# Patient Record
Sex: FEMALE | Race: BLACK OR AFRICAN AMERICAN | Employment: PART TIME | URBAN - METROPOLITAN AREA
[De-identification: names, ages, dates, MRNs, and addresses within clinical notes are randomized per-mention and may not be internally consistent; named-entity substitution may affect disease eponyms.]

---

## 2017-12-03 ENCOUNTER — HOSPITAL ENCOUNTER (EMERGENCY)
Age: 62
Discharge: LEFT BEFORE DISCHARGE | End: 2017-12-03
Attending: EMERGENCY MEDICINE
Payer: SELF-PAY

## 2017-12-03 VITALS
WEIGHT: 270 LBS | HEART RATE: 84 BPM | OXYGEN SATURATION: 100 % | HEIGHT: 66 IN | BODY MASS INDEX: 43.39 KG/M2 | SYSTOLIC BLOOD PRESSURE: 162 MMHG | DIASTOLIC BLOOD PRESSURE: 100 MMHG | TEMPERATURE: 97.7 F | RESPIRATION RATE: 12 BRPM

## 2017-12-03 PROCEDURE — 75810000275 HC EMERGENCY DEPT VISIT NO LEVEL OF CARE

## 2019-11-07 ENCOUNTER — HOSPITAL ENCOUNTER (OUTPATIENT)
Dept: MAMMOGRAPHY | Age: 64
Discharge: HOME | End: 2019-11-07
Attending: INTERNAL MEDICINE
Payer: COMMERCIAL

## 2019-11-07 DIAGNOSIS — Z12.31 VISIT FOR SCREENING MAMMOGRAM: ICD-10-CM

## 2019-11-07 PROCEDURE — 77063 BREAST TOMOSYNTHESIS BI: CPT

## 2020-11-10 ENCOUNTER — HOSPITAL ENCOUNTER (OUTPATIENT)
Dept: MAMMOGRAPHY | Age: 65
Discharge: HOME | End: 2020-11-10
Payer: MEDICAID

## 2020-11-10 DIAGNOSIS — Z12.31 VISIT FOR SCREENING MAMMOGRAM: ICD-10-CM

## 2020-11-10 PROCEDURE — 77063 BREAST TOMOSYNTHESIS BI: CPT

## 2022-03-15 ENCOUNTER — HOSPITAL ENCOUNTER (OUTPATIENT)
Dept: PHYSICAL THERAPY | Age: 67
Discharge: HOME | End: 2022-03-15
Payer: MEDICARE

## 2022-03-15 PROCEDURE — 97161 PT EVAL LOW COMPLEX 20 MIN: CPT

## 2022-03-15 NOTE — PROGRESS NOTES
[facility] [facility] PHYSICAL THERAPY  [address] [place] [address], [address] - Phone: [phone]  Fax: [phone] / [facility]  Patient Name: Kathy Salgado : 1955   Medical   Diagnosis: Right foot pain [M79.671] Treatment Diagnosis: Right Achilles Tendinosis, Pamela's Deformity, Plantar Fasciitis   Onset Date: 2021     Referral Source: David Dunne DPM Start of Care [facility]): 3/15/2022   Prior Hospitalization: See medical history Provider #: [id number]   Prior Level of Function: Recreational walking > 1 mile   Comorbidities: Overweight, HTN,   Medications: Verified on Patient Summary List   The Plan of Care and following information is based on the information from the initial evaluation.   ===========================================================================================  Assessment / key information: Patient is a 73 y.o. female presenting with right foot/ankle pain x 4 months, insidious onset in nature. Patient reports pain has progressed and made her halt her recreational walking, now unable to stand > 10' without significant pain. She also reports disturbed sleep and pain with driving. Objective measures are as follows:                                    AROM                   PROM                Strength (1-5)    Left Right Left Right Left  Right   Dorsiflexsion 5 0 8 7P! 4 4   Plantarflexsion 40 38 49 41 4 4   Inversion 15 15 40 37 4 4   Eversion 15 8 22 20 5 5      Pt  will benefit from physical therapist management to address her impairments (listed below),  educate her, and improve her level of function.  Thanks for your referral.   ===========================================================================================  Eval Complexity: History: MEDIUM  Complexity : 1-2 comorbidities / personal factors will impact the outcome/ POC Exam:MEDIUM Complexity : 3 Standardized tests and measures addressing body structure, function, activity limitation and / or participation in recreation  Presentation: LOW Complexity : Stable, uncomplicated  Clinical Decision Making:MEDIUM Complexity : FOTO score of 26-74Overall Complexity:LOW   Problem List: pain affecting function, decrease ROM, decrease strength, edema affecting function, impaired gait/ balance, decrease ADL/ functional abilitiies, decrease activity tolerance, decrease flexibility/ joint mobility and decrease transfer abilities  FOTO score: 43 indicating 57% functional disability  Treatment Plan may include any combination of the following: Therapeutic exercise, Therapeutic activities, Neuromuscular re-education, Physical agent/modality, Gait/balance training, Patient education, Self Care training, Functional mobility training, Home safety training and Stair training  Patient / Family readiness to learn indicated by: asking questions, trying to perform skills and interest  Persons(s) to be included in education: patient (P)  Barriers to Learning/Limitations: None  Measures taken: n/a   Patient Goal (s): \"walk better and try to avoid getting cut\"   Patient self reported health status: good  Rehabilitation Potential: good   Short Term Goals: To be accomplished in  3  weeks:  1. Patient to be adherent to HEP to facilitate pain control with ADL's.  2. Patient to report > 50% improvement in sleep interrupted by ankle/foot pain. 3. Patient to increase right ankle DF to > 10 degrees to facilitate a more normalized gait.  Long Term Goals: To be accomplished in  6  weeks:  1. Patient to be Safe and Independent with HEP to self-manage/prevent symptoms after DC. 2. Patient to increase FS FOTO score to > 63 to indicate increased functional independence. 3. Patient to demonstrate no difficulty negotiating stairs in reciprocal pattern to facilitate safety in community mobility.    4. Patient to resume her recreational walking program with > 75% improvement. 5. Patient to report pain free driving. Frequency / Duration:   Patient to be seen  2  times per week for 6  weeks:  Patient / Caregiver education and instruction: activity modification and exercises. We reviewed our facility's Patient Personal Responsibilities (PPR) form, particularly in regards to compliance towards her appointment time, our attendance policy, and her home exercise program. Patient was informed of possible discharge for non-compliance to our attendance policy per PPR form. We also discussed her POC as deemed appropriate by the treating therapist and physician. Patient verbalized understanding that she must show objective and functional improvement in an appropriate time frame. Patient verbalized understanding that should progress or compliance be lacking, we will contact the referring physician for further consultation to address and attempt to establish alternate treatment strategies as necessary and/or possibly discharge. Therapist Signature: Leonard Carballo \"BJ\" Michael Werner DPT, Cert. MDT, Cert. DN, Cert. SMT, Dip. Osteopractic Date: 0/31/4169   Certification Period: 3/15/22-6/14/22 Time: 10:50 AM   ===========================================================================================  I certify that the above Physical Therapy Services are being furnished while the patient is under my care. I agree with the treatment plan and certify that this therapy is necessary. Physician Signature:        Date:       Time:                                         Mary Reyes DPM   Please sign and return to [employer] or you may fax the signed copy to [phone]. Thank you.

## 2022-03-15 NOTE — PROGRESS NOTES
PHYSICAL THERAPY - DAILY TREATMENT NOTE  Patient Name: Fredy Brown        Date: 3/15/2022  : 1955   [x]  Patient  Verified  Visit #:   1     Insurance: Payor: Bebeto Reyna / Plan: [email or web address] / Product Type: Managed Care Medicare /      In time:   9:55          Out time:   10:40 Total Treatment Time (min):   45   Medicare Time Tracking (below)   Total Timed Codes (min):  0 1:1 Treatment Time:  0     SUBJECTIVE    Pain Level (on 0 to 10 scale):  8  / 10   Medication Changes/New allergies or changes in medical history, any new surgeries or procedures? []  No    []  Yes   If yes, update Summary List:    Subjective Functional Status/Changes:  []  No changes reported     HISTORY    Present Symptoms:Right achilles, plantar pain. Present since: 4 months ago  [] Improving []  Unchanging []  Worsening          Commenced as a result of:   or [x]  No apparent reason   Had been on a walking program for about a year. Reports had experienced similar ~1.5 years ago but never this bad. Symptoms at onset: achilles pain    Constant symptoms: none    Intermittent symptoms: pain to the heel, calf, foot, Gt. What produces or worsens: standing >10'. Has been unable to walk as long. What stops or reduces: tylenol, rest    Continued use makes the pain:  [] Better [x]  Worse []  No effect     Disturbed night: [] No    [x] Yes    Pain at rest: [] No    [x] Yes       Treatments this episode: prednisone    Previous episodes: 1      Other questions: , mainly working from home    Paraesthesia: [] No    [] Yes     General Health:  [] Good []  Fair []  Poor     Imaging:   [] Yes []  No       Work:  Mechanical Stresses:sitting, sometimes drives    Leisure: Mechanical Stresses:walking    Functional disability from present episode:has had to stop recreational walking, stair intolerance (lives on 2nd floor), driving intolerance, limited foot ware.  AM pain    Summary:    [] Acute []  Sub-acute [x]  Chronic     [] Trauma/Surgery []  Insidious onset        OBJECTIVE    Physical Therapy Evaluation  - Foot and Ankle    Gait: [] Normal    [x] Abnormal    [x] Antalgic    [] NWB    Device:    ROM/Strength  [] Unable to assess at this time      AROM        PROM            Strength (1-5)   Left Right Left Right Left  Right   Dorsiflexsion 5 0 8 7P! 4 4   Plantarflexsion 40 38 49 41 4 4   Inversion 15 15 40 37 4 4   Eversion 15 8 22 20 5 5     Flexibility: [] Unable to assess at this time  Gastroc:    (L) Tightness [] WNL   [] Min   [x] Mod   [] Severe    (R) Tightness [] WNL   [] Min   [x] Mod   [] Severe  Soleus:    (L) Tightness [] WNL   [] Min   [x] Mod   [] Severe    (R) Tightness [] WNL   [] Min   [x] Mod   [] [id number]  Other:      (L) Tightness [] WNL   [] Min   [] Mod   [] Severe    (R) Tightness [] WNL   [] Min   [] Mod   [] Severe    Palpation:   Location:  Patient's Pain Response: [] medial gastroc head   Min   [] Mod   [] Severe  Location: plantar fascia  Patient's Pain Response: [] Min   [] Mod   [] Severe  Forefoot alignment:  [] Neutral     [] Varus            [] Valgus  Other tests/ comments:     Myotome Level Muscles Nerve Reflex Sensation Action   L1-L3 Iliopsoas T12/L1-3  Quadriceps L2-4  Adductors L2-L4 (Iliacus)- Femoral  Femoral  Obturator/Sciatic N/A  L3-4 = Patella L1- Inguinal Crease  L2- Anterior Thigh  L3- Anterior Thigh above knee Hip Flexion  Knee Extension   L4 Tibialis anterior L4&5   Deep Peroneal Patella Anterior Knee Suprapatellar Ankle Dorsiflexion   L5 Extensor Hallucis Longus  Extensor Digitorum Lungus  Gluteus Medius Deep Peroneal     Deep Peroneal    Superior Gluteal None Reliable Dorsum of Foot/Great Toe  Anterior Shank Extensor  to Great Toe        Hip Internal Rotation   S1 Peroneus Longus  Gastrocnemius & Soleus  Gluteus Bernard Superficial Peroneal  Tibial    Inferior Gluteal Achilles Tendon Lateral Shank around Lateral Malleolus  Lateral Aspect/Dorsum of GT   Plantar Flexion      Hip Extension   Notable findings from above: unremarkable      Post Treatment Pain Level (on 0 to 10) scale:   8  / 10     ASSESSMENT    Justification for Eval Code Complexity:  Patient History (low 0, mod 1-2, high 3-4): mod  Examination (low 1-2, mod 3+, high 4+): mod  Clinical Presentation (low stable or uncomplicated, mod evolving or changing, high unstable or unpredictable): low  Clinical Decision Making (low , mod 26-74, high 1-25): FOTO mod       []  See Progress Note/Recertification   Patient will continue to benefit from skilled therapy to address remaining functional deficits:  See PoC   Progress toward goals / Updated goals:    See PoC     PLAN    [x]  Upgrade activities as tolerated []  Continue plan of care   []  Discharge due to :    [x]  Other: Initiate POC     Therapist: Dieter Betts \"BJ\" Rachel Hernandes, DEANAT, Cert MDT, Cert. SMT, Dip.  Osteopractic Date: 3/15/2022     Time: 10:10 AM

## 2022-03-22 ENCOUNTER — HOSPITAL ENCOUNTER (OUTPATIENT)
Dept: PHYSICAL THERAPY | Age: 67
Discharge: HOME | End: 2022-03-22
Payer: MEDICARE

## 2022-03-22 PROCEDURE — 97110 THERAPEUTIC EXERCISES: CPT

## 2022-03-22 PROCEDURE — 97014 ELECTRIC STIMULATION THERAPY: CPT

## 2022-03-22 PROCEDURE — 97140 MANUAL THERAPY 1/> REGIONS: CPT

## 2022-03-23 ENCOUNTER — HOSPITAL ENCOUNTER (OUTPATIENT)
Dept: PHYSICAL THERAPY | Age: 67
Discharge: HOME | End: 2022-03-23
Payer: MEDICARE

## 2022-03-23 PROCEDURE — 97140 MANUAL THERAPY 1/> REGIONS: CPT

## 2022-03-23 PROCEDURE — 97112 NEUROMUSCULAR REEDUCATION: CPT

## 2022-03-23 PROCEDURE — 97110 THERAPEUTIC EXERCISES: CPT

## 2022-03-23 NOTE — PROGRESS NOTES
PHYSICAL THERAPY - DAILY TREATMENT NOTE    Patient Name: Elida Banks        Date: 3/23/2022  : 1955   YES Patient  Verified  Visit #:   3   of     Insurance: Payor: Rebeca Paige / Plan: [employer] / [employer] Type: Managed Care Medicare /      In time: 8:52 Out time: 9:30   Total Treatment Time: 38     Medicare/BCBS Time Tracking (below)   Total Timed Codes (min):  38 1:1 Treatment Time:  38     TREATMENT AREA = Right foot pain [M79.671]    SUBJECTIVE    Pain Level (on 0 to 10 scale):  5  / 10   Medication Changes/New allergies or changes in medical history, any new surgeries or procedures? NO    If yes, update Summary List   Subjective Functional Status/Changes:  []  No changes reported     \"Sorry I'm late. 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 The pain isn't bad. It comes and goes. \"          OBJECTIVE     Therapeutic Procedures:  Min Procedure Specifics + Rationale   n/a [x]  Patient Education (performed throughout session) [x] Review HEP    [] Progressed/Changed HEP based on:   [] proper performance and advancement of Therex/TA   [] reduction in pain level    [] increased functional capacity       [] change in directional preference   23 [x] Therapeutic Exercise   [x]  See Flowsheet   Rationale: increase ROM and increase strength to improve the patients ability to participate in ADL's    15 [x]  Manual Therapy    5 [] IASTM - FRAM, dynamic cupping  [x] repeated DF mobilization  Rationale: decrease pain, increase ROM, increase tissue extensibility, decrease trigger points and increase postural awareness to attain functional use and participation with ADL's  [x] Skin assessment post-treatment:  [x]intact [x]redness- no adverse reaction   []redness  adverse  The manual therapy interventions were performed at a separate and distinct time from the therapeutic activities interventions.      8 [x] Neuromuscular Re-ed   [x]  See Flowsheet  Sima Perez Brandan's  Rationale: increase ROM, increase strength, improve coordination, improve balance and increase proprioception  to improve the patients ability to safely participate in ADL's           Other Objective/Functional Measures: Added and advanced therex per flow sheet. Educated patient re: dynamic cupping/FRAMS     Post Treatment Pain Level (on 0 to 10) scale:   0  / 10     ASSESSMENT    Assessment/Changes in Function:       DF responder          Patient will continue to benefit from skilled PT services to modify and progress therapeutic interventions, address functional mobility deficits, address ROM deficits, address strength deficits, analyze and address soft tissue restrictions, analyze and cue movement patterns, analyze and modify body mechanics/ergonomics, assess and modify postural abnormalities, address imbalance/dizziness and instruct in home and community integration  to attain remaining goals   Progress toward goals / Updated goals:    Able to alleviate and abolish with repeated movements. PLAN    [x]  Upgrade activities as tolerated  [x]  Update interventions per flow sheet YES Continue plan of care   []  Discharge due to :    []  Other:      Therapist: Ibrahima Valdivia \"BJ\" Kedar, SEAMUS, Cert. MDT, Cert. DN, Cert. SMT, Dip.  Osteopractic    Date: 3/23/2022 Time: 9:02 AM     Future Appointments   Date Time Provider Emery Altamirano   3/29/2022  8:45 AM Elba Pi, PT [facility]   3/30/2022  8:45 AM Elba Pi, PT [facility]   4/5/2022  8:45 AM Elba Pi, PT [facility]   4/7/2022  8:45 AM Elba Pi, PT [facility]   4/12/2022  8:45 AM Elba Pi, PT [facility]   4/14/2022  8:45 AM Elba Pi, PT [facility]   4/19/2022  8:45 AM Elba Pi, PT [facility]   4/21/2022  8:45 AM Elba Pi, PT [facility]   4/26/2022  8:45 AM Elba Mcdonnell, PT [facility]   4/27/2022  8:45 AM Elba Mcdonnell, PT [facility]

## 2022-03-29 ENCOUNTER — HOSPITAL ENCOUNTER (OUTPATIENT)
Dept: PHYSICAL THERAPY | Age: 67
Discharge: HOME | End: 2022-03-29
Payer: MEDICARE

## 2022-03-29 PROCEDURE — 97112 NEUROMUSCULAR REEDUCATION: CPT

## 2022-03-29 PROCEDURE — 97140 MANUAL THERAPY 1/> REGIONS: CPT

## 2022-03-29 PROCEDURE — 97014 ELECTRIC STIMULATION THERAPY: CPT

## 2022-03-29 PROCEDURE — 97110 THERAPEUTIC EXERCISES: CPT

## 2022-03-29 NOTE — PROGRESS NOTES
PHYSICAL THERAPY - DAILY TREATMENT NOTE    Patient Name: Lona Ovalles        Date: 3/29/2022  : 1955   YES Patient  Verified  Visit #:   4     Insurance: Payor: Satnam Keating / Plan: [employer] / Product Type: Managed Care Medicare /      In time: 8:30 Out time: 9:40   Total Treatment Time: 70     Medicare/BCBS Time Tracking (below)   Total Timed Codes (min):  70 1:1 Treatment Time:  55     TREATMENT AREA = Right foot pain [M79.671]    SUBJECTIVE    Pain Level (on 0 to 10 scale):  7  / 10   Medication Changes/New allergies or changes in medical history, any new surgeries or procedures? NO    If yes, update Summary List   Subjective Functional Status/Changes:  []  No changes reported     \"I'm walking a little better more and more. \"          OBJECTIVE     Therapeutic Procedures:  Min Procedure Specifics + Rationale   n/a [x]  Patient Education (performed throughout session) [x] Review HEP    [] Progressed/Changed HEP based on:   [] proper performance and advancement of Therex/TA   [] reduction in pain level    [] increased functional capacity       [] change in directional preference   25 [x] Therapeutic Exercise   [x]  See Flowsheet   Rationale: increase ROM and increase strength to improve the patients ability to participate in ADL's    15 [x]  Manual Therapy       [] IASTM - FRAM/Dynamic cupping to posterior leg  [x] G1-G4 talocrural distraction/posterior/anterior glides, G1-G3 calcaneal medial/lateral glides, G1-G4 tarsal mobs. Repeated DF mobilization  Rationale: decrease pain, increase ROM, increase tissue extensibility, decrease trigger points and increase postural awareness to attain functional use and participation with ADL's  [x] Skin assessment post-treatment:  [x]intact [x]redness- no adverse reaction   []redness  adverse  The manual therapy interventions were performed at a separate and distinct time from the therapeutic activities interventions.      15 [x] Neuromuscular Re-ed   [x]  See Flowsheet    Rationale: increase ROM, increase strength, improve coordination, improve balance and increase proprioception  to improve the patients ability to safely participate in ADL's       Modality rationale: decrease inflammation, decrease pain, increase tissue extensibility and increase muscle contraction/control to improve the patients ability to perform ADL's with greater ease     Min Type Additional Details   15 [x] E-Stim Type:Symmetrical Biphasic  Attended? NO Location: right ankle  [] supine              [x] prone  [x] legs elevated   [] legs flat  [] sitting   [] sidelying - [] left [] right  [] with heat  [] with ice  [] Vasopneumatic Device    [x] Skin assessment post-treatment:  [x]intact [x]redness- no adverse reaction       []redness  adverse reaction:       Other Objective/Functional Measures: Added and advanced therex per flow sheet. Increased reps/sets/resistance per flow sheet. Post Treatment Pain Level (on 0 to 10) scale:   6  / 10     ASSESSMENT    Assessment/Changes in Function:       Notable reduction in swelling of Pamela's deformity         Patient will continue to benefit from skilled PT services to modify and progress therapeutic interventions, address functional mobility deficits, address ROM deficits, address strength deficits, analyze and address soft tissue restrictions, analyze and cue movement patterns, analyze and modify body mechanics/ergonomics and instruct in home and community integration  to attain remaining goals   Progress toward goals / Updated goals:    Compliant to HEP, improved gait     PLAN    [x]  Upgrade activities as tolerated  [x]  Update interventions per flow sheet YES Continue plan of care   []  Discharge due to :    []  Other:      Therapist: Kwaku Meza \"BJ\" Kerry Estevez DPT, Cert. MDT, Cert. DN, Cert. SMT, Dip.  Osteopractic    Date: 3/29/2022 Time: 8:45 AM     Future Appointments   Date Time Provider Emery Altamirano 3/30/2022  8:45 AM Cristine Butler, PT [facility]   4/5/2022  8:45 AM Cristine Butler, PT [facility]   4/7/2022  8:45 AM Cristine Butler, PT [facility]   4/12/2022  8:45 AM Cristine Butler, PT [facility]   4/14/2022  8:45 AM Cristine Butler, PT [facility]   4/19/2022  8:45 AM Cristine Butler, PT [facility]   4/21/2022  8:45 AM Cristine Butler, PT [facility]   4/26/2022  8:45 AM Cristine Butler, PT [facility]   4/27/2022  8:45 AM [facility]

## 2022-03-30 ENCOUNTER — HOSPITAL ENCOUNTER (OUTPATIENT)
Dept: PHYSICAL THERAPY | Age: 67
Discharge: HOME | End: 2022-03-30
Payer: MEDICARE

## 2022-03-30 PROCEDURE — 97110 THERAPEUTIC EXERCISES: CPT

## 2022-03-30 PROCEDURE — 97112 NEUROMUSCULAR REEDUCATION: CPT

## 2022-03-30 PROCEDURE — 97140 MANUAL THERAPY 1/> REGIONS: CPT

## 2022-03-30 PROCEDURE — 97014 ELECTRIC STIMULATION THERAPY: CPT

## 2022-03-30 NOTE — PROGRESS NOTES
PHYSICAL THERAPY - DAILY TREATMENT NOTE    Patient Name: Becky Child        Date: 3/30/2022  : 1955   YES Patient  Verified  Visit #:   5   of     Insurance: Payor: [employer] / Plan: [employer] / Product Type: Managed Care Medicare /      In time: 8:38 Out time: 9:38   Total Treatment Time: 60     Medicare/BCBS Time Tracking (below)   Total Timed Codes (min):  60 1:1 Treatment Time:  45     TREATMENT AREA = Right foot pain [M79.671]    SUBJECTIVE    Pain Level (on 0 to 10 scale):  5  / 10   Medication Changes/New allergies or changes in medical history, any new surgeries or procedures? NO    If yes, update Summary List   Subjective Functional Status/Changes:  []  No changes reported     \"I was ok when I left. Sore this morning. \"          OBJECTIVE     Therapeutic Procedures:  Min Procedure Specifics + Rationale   n/a [x]  Patient Education (performed throughout session) [x] Review HEP    [] Progressed/Changed HEP based on:   [] proper performance and advancement of Therex/TA   [] reduction in pain level    [] increased functional capacity       [] change in directional preference   20 [x] Therapeutic Exercise   [x]  See Flowsheet   Rationale: increase ROM and increase strength to improve the patients ability to participate in ADL's    15 [x]  Manual Therapy         [x] G1-G4 talocrural distraction/posterior/anterior glides, G1-G3 calcaneal medial/lateral glides, G1-G4 tarsal mobs. Repeated DF mobilization    Rationale: decrease pain, increase ROM, increase tissue extensibility, decrease trigger points and increase postural awareness to attain functional use and participation with ADL's  [x] Skin assessment post-treatment:  [x]intact [x]redness- no adverse reaction   []redness  adverse  The manual therapy interventions were performed at a separate and distinct time from the therapeutic activities interventions.      10 [x] Neuromuscular Re-ed   [x]  See Flowsheet  Rationale: increase ROM, increase strength, improve coordination, improve balance and increase proprioception  to improve the patients ability to safely participate in ADL's       Modality rationale: decrease inflammation, decrease pain, increase tissue extensibility and increase muscle contraction/control to improve the patients ability to perform ADL's with greater ease     Min Type Additional Details   15 [x] E-Stim Type:Symmetrical Biphasic  Attended? NO Location: Ankle  [] supine              [] prone  [] legs elevated   [] legs flat  [] sitting   [] sidelying - [] left [] right  [] with heat  [] with ice  [] Vasopneumatic Device    [x] Skin assessment post-treatment:  [x]intact [x]redness- no adverse reaction       []redness  adverse reaction:       Other Objective/Functional Measures: Added and advanced therex per flow sheet. Post Treatment Pain Level (on 0 to 10) scale:   0 achilles, 0 plantar fascia, 7 nathaly  / 10     ASSESSMENT    Assessment/Changes in Function:       Increased standing activity tolerance         Patient will continue to benefit from skilled PT services to modify and progress therapeutic interventions, address functional mobility deficits, address ROM deficits, address strength deficits, analyze and address soft tissue restrictions, analyze and cue movement patterns, analyze and modify body mechanics/ergonomics and instruct in home and community integration  to attain remaining goals   Progress toward goals / Updated goals: Increased standing tolerance     PLAN    [x]  Upgrade activities as tolerated  [x]  Update interventions per flow sheet YES Continue plan of care   []  Discharge due to :    []  Other:      Therapist: Cruz Em \"BJ\" SEAMUS Thacker, Cert. MDT, Cert. DN, Cert. SMT, Dip.  Osteopractic    Date: 3/30/2022 Time: 9:05 AM     Future Appointments   Date Time Provider Emery Altamirano   4/5/2022  8:45 AM Oumar Anthony PT [facility]   4/7/2022  8:45 AM Kedar Jeremie Mortensen [facility]   4/12/2022  8:45 AM Pretty Pappas, PT [facility]   4/14/2022  8:45 AM Pretty Pappas, PT [facility]   4/19/2022  8:45 AM Pretty Pappas, PT [facility]   4/21/2022  8:45 AM Pretty Pappas, PT [facility]   4/26/2022  8:45 AM Pretty Pappas, PT [facility]   4/27/2022  8:45 AM [facility]

## 2022-04-05 ENCOUNTER — APPOINTMENT (OUTPATIENT)
Dept: PHYSICAL THERAPY | Age: 67
End: 2022-04-05
Payer: MEDICARE

## 2022-04-07 ENCOUNTER — APPOINTMENT (OUTPATIENT)
Dept: PHYSICAL THERAPY | Age: 67
End: 2022-04-07
Payer: MEDICARE

## 2022-04-12 ENCOUNTER — HOSPITAL ENCOUNTER (OUTPATIENT)
Dept: PHYSICAL THERAPY | Age: 67
Discharge: HOME | End: 2022-04-12
Payer: MEDICARE

## 2022-04-12 PROCEDURE — 97014 ELECTRIC STIMULATION THERAPY: CPT

## 2022-04-12 PROCEDURE — 97110 THERAPEUTIC EXERCISES: CPT

## 2022-04-12 NOTE — PROGRESS NOTES
PHYSICAL THERAPY - DAILY TREATMENT NOTE    Patient Name: Ritu Melendez        Date: 2022  : 1955   YES Patient  Verified  Visit #:   6     Insurance: Payor: Carol Loja / Plan: [employer] / Product Type: Managed Care Medicare /      In time: 8:38 Out time: 9:31   Total Treatment Time: 53     Medicare/BCBS Time Tracking (below)   Total Timed Codes (min):  53 1:1 Treatment Time:  38     TREATMENT AREA = Right foot pain [M79.671]    SUBJECTIVE    Pain Level (on 0 to 10 scale):  9  / 10   Medication Changes/New allergies or changes in medical history, any new surgeries or procedures? NO    If yes, update Summary List   Subjective Functional Status/Changes:  []  No changes reported     \"I'm sorry I missed last week I had a death in the family. I'm walking better but it hurts at that spot. \"          OBJECTIVE     Therapeutic Procedures:  Min Procedure Specifics + Rationale   n/a [x]  Patient Education (performed throughout session) [x] Review HEP    [] Progressed/Changed HEP based on:   [] proper performance and advancement of Therex/TA   [] reduction in pain level    [] increased functional capacity       [] change in directional preference   38 [x] Therapeutic Exercise   [x]  See Flowsheet   Rationale: increase ROM and increase strength to improve the patients ability to participate in ADL's        Modality rationale: decrease inflammation, decrease pain, increase tissue extensibility and increase muscle contraction/control to improve the patients ability to perform ADL's with greater ease     Min Type Additional Details   15 [x] E-Stim Type:Symmetrical Biphasic  Attended? NO Location: achilles  [x] supine              [] prone  [x] legs elevated   [] legs flat  [] sitting   [] sidelying - [] left [] right  [] with heat  [] with ice  [] Vasopneumatic Device    [x] Skin assessment post-treatment:  [x]intact [x]redness- no adverse reaction       []redness  adverse reaction:       Other Objective/Functional Measures: Added and advanced therex per flow sheet. Post Treatment Pain Level (on 0 to 10) scale:   \"better\"  / 10     ASSESSMENT    Assessment/Changes in Function:       Performed new therex well with minimal complaints         Patient will continue to benefit from skilled PT services to modify and progress therapeutic interventions, address functional mobility deficits, address ROM deficits, address strength deficits, analyze and address soft tissue restrictions, analyze and cue movement patterns, analyze and modify body mechanics/ergonomics, assess and modify postural abnormalities, address imbalance/dizziness and instruct in home and community integration  to attain remaining goals   Progress toward goals / Updated goals:    Notable improvement in gait mechanics     PLAN    [x]  Upgrade activities as tolerated  [x]  Update interventions per flow sheet YES Continue plan of care   []  Discharge due to :    []  Other:      Therapist: Nedra Sloan \"BJ\" Ke Nair, DPT, Cert. MDT, Cert. DN, Cert. SMT, Dip.  Osteopractic    Date: 4/12/2022 Time: 8:40 AM     Future Appointments   Date Time Provider Emery Altamirano   4/12/2022  8:45 AM Ana Lewis PT [facility]   4/14/2022  8:45 AM Ana Lewis PT [facility]   4/19/2022  8:45 AM Ana Lewis PT [facility]   4/21/2022  8:45 AM Ana Lewis PT [facility]   4/26/2022  8:45 AM Aan Lewis PT [facility]   4/27/2022  8:45 AM Ana Lewis PT [facility]

## 2022-04-14 ENCOUNTER — HOSPITAL ENCOUNTER (OUTPATIENT)
Dept: PHYSICAL THERAPY | Age: 67
Discharge: HOME | End: 2022-04-14
Payer: MEDICARE

## 2022-04-14 PROCEDURE — 97110 THERAPEUTIC EXERCISES: CPT

## 2022-04-14 PROCEDURE — 97140 MANUAL THERAPY 1/> REGIONS: CPT

## 2022-04-14 NOTE — PROGRESS NOTES
[address]  [facility] PHYSICAL THERAPY  [address] [facility] [address], [address] - Phone: [phone]  Fax: [phone]  PROGRESS NOTE  Patient Name: Estelle Martinez : 1955   Treatment/Medical Diagnosis: Right foot pain [M79.671]   Referral Source: Linda Velazquez DPM     Date of Initial Visit: 3/15/22 Attended Visits: 7 Missed Visits: 2     SUMMARY OF TREATMENT  Patient's POC has consisted of therex, therapeutic activities, manual therapy prn, modalities prn, pt. education, and a comprehensive HEP. Treatment strategies used to address functional mobility deficits, ROM deficits, strength deficits, analyze and address soft tissue restrictions, analyze and cue movement patterns, analyze and modify body mechanics/ergonomics, assess and modify postural abnormalities and instruct in home and community integration. CURRENT STATUS  Patient making steady progress and now demonstrates a normalized gait. She missed 1 week due to her aunt passing away. She maintained HEP compliance during that time. Goal/Measure of Progress Goal Met? 1. Patient to be adherent to HEP to facilitate pain control with ADL's.  2. Patient to report > 50% improvement in sleep interrupted by ankle/foot pain. 3. Patient to increase right ankle DF to > 10 degrees to facilitate a more normalized gait. MET    MET    MET     New Goals to be achieved in __2-4__  weeks:  1. Patient to be Safe and Independent with HEP to self-manage/prevent symptoms after DC. 2. Patient to increase FS FOTO score to > 63 to indicate increased functional independence. 3. Patient to demonstrate no difficulty negotiating stairs in reciprocal pattern to facilitate safety in community mobility. 4. Patient to resume her recreational walking program with > 75% improvement. 5. Patient to report pain free driving.    Frequency / Duration:   Patient to be seen  2  times per week for 2-4  weeks:    RECOMMENDATIONS  Continue and progress functional therex/therapeutic activity as able, utilizing manual therapy and modalities prn. Progress patient towards independent HEP to facilitate self-management of symptoms and progress gains after PT. If you have any questions/comments please contact us directly at [phone]. Thank you for allowing us to assist in the care of your patient. Therapist Signature: Cheryle Mill \"BJ\" Kulwant Almanza DPT, Cert. MDT, Cert. DN, Cert. SMT, Dip. Osteopractic Date: 8/25/6371   Certification Period: 3/15/22-6/14/22     Reporting Period 3/15/22-4/14/22   Time: 9:36 AM   NOTE TO PHYSICIAN:  PLEASE COMPLETE THE ORDERS BELOW AND FAX TO   [facility]: [phone]. If you are unable to process this request in 24 hours please contact our office: [phone].    ___ I have read the above report and request that my patient continue as recommended.   ___ I have read the above report and request that my patient continue therapy with the following changes/special instructions:_________________________________________________________   ___ I have read the above report and request that my patient be discharged from therapy.      Physician Signature:        Date:       Time:                                        Gabino Larkin DPM

## 2022-04-14 NOTE — PROGRESS NOTES
PHYSICAL THERAPY - DAILY TREATMENT NOTE    Patient Name: Fabian Galdamez        Date: 2022  : 1955   YES Patient  Verified  Visit #:   7     Insurance: Payor: Tatakarleykelby [employer] / Plan: [employer] / Product Type: Managed Care Medicare /      In time: 8:44 Out time: 9:18   Total Treatment Time: 38     Medicare/BCBS Time Tracking (below)   Total Timed Codes (min):  38 1:1 Treatment Time:  38     TREATMENT AREA = Right foot pain [M79.671]    SUBJECTIVE    Pain Level (on 0 to 10 scale):  7-8  / 10   Medication Changes/New allergies or changes in medical history, any new surgeries or procedures? NO    If yes, update Summary List   Subjective Functional Status/Changes:  []  No changes reported     \"I'm ok. I'm just really stressed out from work stuff.  \"          OBJECTIVE     Therapeutic Procedures:  Min Procedure Specifics + Rationale   n/a [x]  Patient Education (performed throughout session) [x] Review HEP    [] Progressed/Changed HEP based on:   [] proper performance and advancement of Therex/TA   [] reduction in pain level    [] increased functional capacity       [] change in directional preference   8 [x] Therapeutic Exercise   [x]  See Flowsheet   Rationale: increase ROM and increase strength to improve the patients ability to participate in ADL's      30 []  Manual Therapy       [x] IASTM  - FRAM, dynamic cupping to right posterior leg  [] TDN (see objective; actual needle insertion time not billed)   [x] Kiniesiotaping to achilles  Rationale: decrease pain, increase ROM, increase tissue extensibility, decrease trigger points and increase postural awareness to attain functional use and participation with ADL's  [x] Skin assessment post-treatment:  [x]intact []redness- no adverse reaction   []redness  adverse         Other Objective/Functional Measures:    Educated patient re: KT taping     Post Treatment Pain Level (on 0 to 10) scale:   0  / 10 ASSESSMENT    Assessment/Changes in Function:       Responded well to KT and manual as patient reported no pain upon return to stand          Patient will continue to benefit from skilled PT services to modify and progress therapeutic interventions, address functional mobility deficits, address ROM deficits, address strength deficits, analyze and address soft tissue restrictions, analyze and cue movement patterns, analyze and modify body mechanics/ergonomics and instruct in home and community integration  to attain remaining goals   Progress toward goals / Updated goals:    See PN     PLAN    [x]  Upgrade activities as tolerated  [x]  Update interventions per flow sheet YES Continue plan of care   []  Discharge due to :    []  Other:      Therapist: Jeannie Johnson \"BJ\" Jordin Martinez, DPT, Cert. MDT, Cert. DN, Cert. SMT, Dip.  Osteopractic    Date: 4/14/2022 Time: 8:51 AM     Future Appointments   Date Time Provider Emery Altamirano   4/19/2022  8:45 AM Tammy Stokes PT [facility]   4/21/2022  8:45 AM Tammy Stokes PT [facility]   4/26/2022  8:45 AM Tammy Stokes PT [facility]   4/27/2022  8:45 AM Tammy Stokes PT [facility]

## 2022-04-19 ENCOUNTER — HOSPITAL ENCOUNTER (OUTPATIENT)
Dept: PHYSICAL THERAPY | Age: 67
Discharge: HOME | End: 2022-04-19
Payer: MEDICARE

## 2022-04-19 PROCEDURE — 97116 GAIT TRAINING THERAPY: CPT

## 2022-04-19 PROCEDURE — 97530 THERAPEUTIC ACTIVITIES: CPT

## 2022-04-19 PROCEDURE — 97110 THERAPEUTIC EXERCISES: CPT

## 2022-04-19 NOTE — PROGRESS NOTES
PHYSICAL THERAPY - DAILY TREATMENT NOTE    Patient Name: Gabbie Reese        Date: 2022  : 1955   YES Patient  Verified  Visit #:     Insurance: Payor: Virginia Cabrera / Plan: [employer] / Product Type: Managed Care Medicare /      In time: 8:45 Out time: 9:25   Total Treatment Time: 40     Medicare/BCBS Time Tracking (below)   Total Timed Codes (min):  40 1:1 Treatment Time:  40     TREATMENT AREA = Right foot pain [M79.671]    SUBJECTIVE    Pain Level (on 0 to 10 scale):  7   10   Medication Changes/New allergies or changes in medical history, any new surgeries or procedures? NO    If yes, update Summary List   Subjective Functional Status/Changes:  []  No changes reported     \"I went to [place] to see my daughter and grand kids. They had an easter egg hunt. The pain isn't always there but it comes and goes. \"          OBJECTIVE     Therapeutic Procedures:  Min Procedure Specifics + Rationale   n/a [x]  Patient Education (performed throughout session) [x] Review HEP    [] Progressed/Changed HEP based on:   [] proper performance and advancement of Therex/TA   [] reduction in pain level    [] increased functional capacity       [] change in directional preference   15 [x] Therapeutic Exercise   [x]  See Flowsheet   Rationale: increase ROM and increase strength to improve the patients ability to participate in ADL's    17 [x] Therapeutic Activity   [x]  See Flowsheet  Rationale: To improve safety, proprioception, coordination, and efficiency with tasks     8 [x]  Gait Training HK/Retro/SS  Rationale: Normalize gait, increase proprioceptive and kinesthetic awareness, coordination, balance           Other Objective/Functional Measures: Added and advanced therex per flow sheet.        Post Treatment Pain Level (on 0 to 10) scale:   0  / 10     ASSESSMENT    Assessment/Changes in Function:       No LoB with ambulation         Patient will continue to benefit from skilled PT services to modify and progress therapeutic interventions, address functional mobility deficits, address ROM deficits, address strength deficits, analyze and address soft tissue restrictions, analyze and cue movement patterns, analyze and modify body mechanics/ergonomics, assess and modify postural abnormalities, address imbalance/dizziness and instruct in home and community integration  to attain remaining goals   Progress toward goals / Updated goals:    1st session since progress note. No significant progress observed       PLAN    [x]  Upgrade activities as tolerated  [x]  Update interventions per flow sheet YES Continue plan of care   []  Discharge due to :    []  Other:      Therapist: Abbey Fletcher \"BJ\" Kedar, DPT, Cert. MDT, Cert. DN, Cert. SMT, Dip.  Osteopractic    Date: 4/19/2022 Time: 8:56 AM     Future Appointments   Date Time Provider Emery Altamirano   4/21/2022  8:45 AM July Rekha, PT [facility]   4/26/2022  8:45 AM Julygary Da Silva, PT [facility]   4/27/2022  8:45 AM July Da Silva, PT [facility]

## 2022-04-21 ENCOUNTER — HOSPITAL ENCOUNTER (OUTPATIENT)
Dept: PHYSICAL THERAPY | Age: 67
Discharge: HOME | End: 2022-04-21
Payer: MEDICARE

## 2022-04-21 PROCEDURE — 97112 NEUROMUSCULAR REEDUCATION: CPT

## 2022-04-21 PROCEDURE — 97140 MANUAL THERAPY 1/> REGIONS: CPT

## 2022-04-21 PROCEDURE — 97110 THERAPEUTIC EXERCISES: CPT

## 2022-04-21 NOTE — PROGRESS NOTES
PHYSICAL THERAPY - DAILY TREATMENT NOTE    Patient Name: Lona Ovalles        Date: 2022  : 1955   YES Patient  Verified  Visit #:   +  Insurance: Payor: Satnam Keating / Plan: [employer] / Product Type: Managed Care Medicare /      In time: 8:43 Out time: 9:36   Total Treatment Time: 53     Medicare/BCBS Time Tracking (below)   Total Timed Codes (min):  53 1:1 Treatment Time:  53     TREATMENT AREA = Right foot pain [M79.671]    SUBJECTIVE    Pain Level (on 0 to 10 scale):  9  / 10   Medication Changes/New allergies or changes in medical history, any new surgeries or procedures? NO    If yes, update Summary List   Subjective Functional Status/Changes:  []  No changes reported     \"The bottom of my foot and my achilles feels great. It's just that spot (Pamela) that hurts a lot. 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      10 [x] Neuromuscular Re-ed   [x]  See Flowsheet  MSR:  -Stance EO/EC  -Midfoot EO/EC  -Bunion EO/EC  -GT EO/EC  -Tandem EO/EC  MSR on AE:  -Stance EO/EC  -Midfoot EO/EC  -Bunion EO/EC  -GT EO/EC  -Tandem EO/EC    Rationale: increase ROM, increase strength, improve coordination, improve balance and increase proprioception  to improve the patients ability to safely participate in ADL's           Other Objective/Functional Measures:    Notable balance difficulties     Post Treatment Pain Level (on 0 to 10) scale:   0  / 10     ASSESSMENT    Assessment/Changes in Function:       Responds well to KT taping         Patient will continue to benefit from skilled PT services to modify and progress therapeutic interventions, address functional mobility deficits, address ROM deficits, address strength deficits, analyze and address soft tissue restrictions, analyze and cue movement patterns, analyze and modify body mechanics/ergonomics, assess and modify postural abnormalities and instruct in home and community integration  to attain remaining goals   Progress toward goals / Updated goals:    1st session since progress note. No significant progress observed       PLAN    [x]  Upgrade activities as tolerated  [x]  Update interventions per flow sheet YES Continue plan of care   []  Discharge due to :    []  Other:      Therapist: Chantelle Collins \"BJ\" SEAMUS Thacker, Cert. MDT, Cert. DN, Cert. SMT, Dip.  Osteopractic    Date: 4/21/2022 Time: 8:57 AM     Future Appointments   Date Time Provider Emery Altamirano   4/26/2022  8:45 AM Roberta Raymundo PT [facility]   4/27/2022  8:45 AM Roberta Raymundo PT [facility]

## 2022-04-26 ENCOUNTER — HOSPITAL ENCOUNTER (OUTPATIENT)
Dept: PHYSICAL THERAPY | Age: 67
Discharge: HOME | End: 2022-04-26
Payer: MEDICARE

## 2022-04-26 PROCEDURE — 97530 THERAPEUTIC ACTIVITIES: CPT

## 2022-04-26 PROCEDURE — 97110 THERAPEUTIC EXERCISES: CPT

## 2022-04-26 PROCEDURE — 97140 MANUAL THERAPY 1/> REGIONS: CPT

## 2022-04-26 NOTE — PROGRESS NOTES
PHYSICAL THERAPY - DAILY TREATMENT NOTE    Patient Name: Angela Sims        Date: 2022  : 1955   YES Patient  Verified  Visit #:   10   of   12  Insurance: Payor: Melquiades Guallpa / Plan: [employer] / Product Type: Managed Care Medicare /      In time: 8:30early Out time: 9:23   Total Treatment Time: 53     Medicare/BCBS Time Tracking (below)   Total Timed Codes (min):  53 1:1 Treatment Time:  53     TREATMENT AREA = Right foot pain [M79.671]    SUBJECTIVE    Pain Level (on 0 to 10 scale):  10  / 10   Medication Changes/New allergies or changes in medical history, any new surgeries or procedures? NO    If yes, update Summary List   Subjective Functional Status/Changes:  []  No changes reported     \"It's sporadic. It feels fine some times and other times it really hurts. \"          OBJECTIVE     Therapeutic Procedures:  Min Procedure Specifics + Rationale   n/a [x]  Patient Education (performed throughout session) [x] Review HEP    [] Progressed/Changed HEP based on:   [] proper performance and advancement of Therex/TA   [] reduction in pain level    [] increased functional capacity       [] change in directional preference   33 [x] Therapeutic Exercise   [x]  See Flowsheet   Rationale: increase ROM and increase strength to improve the patients ability to participate in ADL's    10 [x]  Manual Therapy       [] IASTM -   [x] KT taping to calf/foot  Rationale: decrease pain, increase ROM, increase tissue extensibility, decrease trigger points and increase postural awareness to attain functional use and participation with ADL's  [x] Skin assessment post-treatment:  [x]intact [x]redness- no adverse reaction   []redness - adverse  The manual therapy interventions were performed at a separate and distinct time from the therapeutic activities interventions. 10 [x] Therapeutic Activity   [x]  See Flowsheet  Re-assessment  Rationale:  To improve safety, proprioception, coordination, and efficiency with tasks           Other Objective/Functional Measures:    See DC     Post Treatment Pain Level (on 0 to 10) scale:   5  / 10     ASSESSMENT    Assessment/Changes in Function:       See DC         Patient will continue to benefit from skilled PT services to n/a  to attain remaining goals   Progress toward goals / Updated goals:    See DC     PLAN    [x]  Upgrade activities as tolerated  [x]  Update interventions per flow sheet NO Continue plan of care   []  Discharge due to :    []  Other:      Therapist: Alyssa English \"BJ\" SEAMUS Thacker, Cert. MDT, Cert. DN, Cert. SMT, Dip.  Osteopractic    Date: 4/26/2022 Time: 8:56 AM     Future Appointments   Date Time Provider Emery Altamirano   4/27/2022  8:45 AM Eddi Gracia, PT [facility]

## 2022-04-26 NOTE — PROGRESS NOTES
[facility] PHYSICAL THERAPY  [address] [place] [address], [address] - Phone: [phone]  Fax: [phone] SUMMARY  Patient Name: Rachel Coyle : 1955   Treatment/Medical Diagnosis: Right foot pain [M79.671]   Referral Source: José Sellers DPM     Date of Initial Visit: 3/15/22 Attended Visits: 10 Missed Visits: 2     SUMMARY OF TREATMENT  Patient's POC has consisted of therex, therapeutic activities, manual therapy prn, modalities prn, pt. education, and a comprehensive HEP. Treatment strategies used to address functional mobility deficits, ROM deficits, strength deficits, analyze and address soft tissue restrictions, analyze and cue movement patterns, analyze and modify body mechanics/ergonomics, assess and modify postural abnormalities and instruct in home and community integration. 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 Calf, achilles, and plantar fascia pain are resolved. 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 We discussed other treatment options for her to continue with, including KT taping as she found success with them in the clinic. She mentions possibly pursuing surgery at this time. GOALS/MEASURE OF PROGRESS Goal Met? 1. Patient to be Safe and Independent with HEP to self-manage/prevent symptoms after DC. 2. Patient to increase FS FOTO score to > 63 to indicate increased functional independence. 3. Patient to demonstrate no difficulty negotiating stairs in reciprocal pattern to facilitate safety in community mobility. 4. Patient to resume her recreational walking program with > 75% improvement. 5. Patient to report pain free driving. MET      NO      MET      NO    NO     RECOMMENDATIONS  Discontinue therapy due to lack of appreciable progress towards goals.     If you have any questions/comments please contact us directly at [phone]. Thank you for allowing us to assist in the care of your patient. Therapist Signature: Sravani Ramey \"BJ\" Alexis Harper, DPT, Cert. MDT, Cert. DN, Cert. SMT, Dip. Osteopractic Date: 4/26/22   Reporting Period: 9/20/74-5/53/07     Certification Period 3/15/22-6/15/22 Time: 9:06 AM     NOTE TO PHYSICIAN:  PLEASE COMPLETE THE ORDERS BELOW AND FAX TO   [facility]: [phone]  If you are unable to process this request in 24 hours please contact our office: [phone]    ___ I have read the above report and request that my patient continue as recommended.   ___ I have read the above report and request that my patient continue therapy with the following changes/special instructions:_________________________________________________________   ___ I have read the above report and request that my patient be discharged from therapy.      Physician Signature:        Date:     Time:

## 2022-04-27 ENCOUNTER — APPOINTMENT (OUTPATIENT)
Dept: PHYSICAL THERAPY | Age: 67
End: 2022-04-27
Payer: MEDICARE

## 2022-05-23 ENCOUNTER — TRANSCRIBE ORDER (OUTPATIENT)
Dept: SCHEDULING | Age: 67
End: 2022-05-23

## 2022-05-23 DIAGNOSIS — Z12.31 VISIT FOR SCREENING MAMMOGRAM: Primary | ICD-10-CM

## 2022-05-31 ENCOUNTER — HOSPITAL ENCOUNTER (OUTPATIENT)
Dept: WOMENS IMAGING | Age: 67
Discharge: HOME | End: 2022-05-31
Attending: INTERNAL MEDICINE
Payer: MEDICARE

## 2022-05-31 DIAGNOSIS — Z12.31 VISIT FOR SCREENING MAMMOGRAM: ICD-10-CM

## 2022-05-31 PROCEDURE — 77063 BREAST TOMOSYNTHESIS BI: CPT

## 2023-11-13 ENCOUNTER — HOSPITAL ENCOUNTER (OUTPATIENT)
Dept: WOMENS IMAGING | Facility: HOSPITAL | Age: 68
Discharge: HOME | End: 2023-11-16
Payer: MEDICARE

## 2023-11-13 DIAGNOSIS — Z12.31 VISIT FOR SCREENING MAMMOGRAM: ICD-10-CM

## 2023-11-13 PROCEDURE — 77063 BREAST TOMOSYNTHESIS BI: CPT

## 2024-06-11 ENCOUNTER — OFFICE VISIT (OUTPATIENT)
Age: 69
End: 2024-06-11

## 2024-06-11 VITALS
HEIGHT: 66 IN | WEIGHT: 267 LBS | TEMPERATURE: 98 F | DIASTOLIC BLOOD PRESSURE: 74 MMHG | BODY MASS INDEX: 42.91 KG/M2 | SYSTOLIC BLOOD PRESSURE: 117 MMHG | OXYGEN SATURATION: 94 % | HEART RATE: 83 BPM

## 2024-06-11 DIAGNOSIS — R22.0 SCALP MASS: Primary | ICD-10-CM

## 2024-06-11 NOTE — PROGRESS NOTES
Patient called back- She states she had an ARIEL done 12/18/19 and she has not gotten any better. Writer informed her that is can take up to a week to get relief from this. She states with the holidays coming up she does not want to be in this pain and is wondering if she can get something for the pain she is having. She is currently taking Tylenol 1000mg Q 4hrs PRN. She uses [employer]. Routed to provider for further instruction. Yumi Muse is a 69 y.o. female (: 1955) presenting to address:    Chief Complaint   Patient presents with    New Patient     Mass of scalp/referred by Dr. Rodolfo Wallis       Medication list and allergies have been reviewed with Yumi Muse and updated as of today's date.     I have gone over all Medical, Surgical and Social History with Yumi Muse and updated/added the information accordingly.

## 2024-06-11 NOTE — PROGRESS NOTES
General Surgery Consult    Yumi Muse  Admit date: (Not on file)    MRN: [id number]     : 1955     Age: 69 y.o.        Attending Physician: Loyda Tipton MD, [facility]      History of Present Illness:      Yumi Muse is a 69 y.o. female who is here for a scalp mass that has been present for years.  It is not causing any pain or discomfort.     Patient Active Problem List    Diagnosis Date Noted    Fracture of ankle, lateral malleolus, left, closed 10/02/2015    Maceration of skin 10/02/2015    Obesity     HTN (hypertension)     Left ankle pain      Past Medical History:   Diagnosis Date    HTN (hypertension)     Obesity     Pneumonia       Past Surgical History:   Procedure Laterality Date    HYSTERECTOMY (CERVIX STATUS UNKNOWN)  age 30's      Social History     Tobacco Use    Smoking status: Never    Smokeless tobacco: Never   Substance Use Topics    Alcohol use: No      Social History     Tobacco Use   Smoking Status Never   Smokeless Tobacco Never     Family History   Problem Relation Age of Onset    Breast Cancer Maternal Aunt       Current Outpatient Medications   Medication Sig    verapamil (CALAN SR) 180 MG extended release tablet Take 1 tablet by mouth    traMADol (ULTRAM) 50 MG tablet Take 50 mg by mouth every 8 hours as needed. (Patient not taking: Reported on 2024)     No current facility-administered medications for this visit.      No Known Allergies       Review of Systems:  Pertinent items are noted in the History of Present Illness.    Objective:     /74 (Site: Right Upper Arm, Position: Sitting, Cuff Size: Large Adult)   Pulse 83   Temp 98 °F (36.7 °C) (Temporal)   Ht 1.676 m (5' 6\")   Wt 121.1 kg (267 lb)   SpO2 94%   BMI 43.09 kg/m²     Physical Exam:      General:  in no apparent distress   Scalp:  There is a sebaceous cyst located on the scalp measuring less than 1 cm with no sign of infection.                               Imaging and Lab Review:     CBC: No

## 2024-11-15 ENCOUNTER — HOSPITAL ENCOUNTER (OUTPATIENT)
Dept: WOMENS IMAGING | Facility: HOSPITAL | Age: 69
Discharge: HOME | End: 2024-11-18
Payer: MEDICARE

## 2024-11-15 VITALS — HEIGHT: 66 IN | WEIGHT: 267 LBS | BODY MASS INDEX: 42.91 KG/M2

## 2024-11-15 DIAGNOSIS — Z12.31 ENCOUNTER FOR SCREENING MAMMOGRAM FOR MALIGNANT NEOPLASM OF BREAST: ICD-10-CM

## 2024-11-15 PROCEDURE — 77063 BREAST TOMOSYNTHESIS BI: CPT

## 2025-07-14 ENCOUNTER — TRANSCRIBE ORDERS (OUTPATIENT)
Facility: HOSPITAL | Age: 70
End: 2025-07-14

## 2025-07-14 DIAGNOSIS — R07.89 ATYPICAL CHEST PAIN: ICD-10-CM

## 2025-07-14 DIAGNOSIS — I20.89 ATYPICAL ANGINA: Primary | ICD-10-CM
